# Patient Record
Sex: FEMALE | Race: WHITE | NOT HISPANIC OR LATINO | URBAN - METROPOLITAN AREA
[De-identification: names, ages, dates, MRNs, and addresses within clinical notes are randomized per-mention and may not be internally consistent; named-entity substitution may affect disease eponyms.]

---

## 2017-05-08 ENCOUNTER — APPOINTMENT (RX ONLY)
Dept: URBAN - METROPOLITAN AREA CLINIC 91 | Facility: CLINIC | Age: 82
Setting detail: DERMATOLOGY
End: 2017-05-08

## 2017-05-08 DIAGNOSIS — L85.3 XEROSIS CUTIS: ICD-10-CM

## 2017-05-08 DIAGNOSIS — D22 MELANOCYTIC NEVI: ICD-10-CM

## 2017-05-08 DIAGNOSIS — L57.0 ACTINIC KERATOSIS: ICD-10-CM

## 2017-05-08 DIAGNOSIS — Z71.89 OTHER SPECIFIED COUNSELING: ICD-10-CM

## 2017-05-08 DIAGNOSIS — L82.1 OTHER SEBORRHEIC KERATOSIS: ICD-10-CM

## 2017-05-08 PROBLEM — Z85.828 PERSONAL HISTORY OF OTHER MALIGNANT NEOPLASM OF SKIN: Status: ACTIVE | Noted: 2017-05-08

## 2017-05-08 PROBLEM — E78.5 HYPERLIPIDEMIA, UNSPECIFIED: Status: ACTIVE | Noted: 2017-05-08

## 2017-05-08 PROBLEM — D48.5 NEOPLASM OF UNCERTAIN BEHAVIOR OF SKIN: Status: ACTIVE | Noted: 2017-05-08

## 2017-05-08 PROBLEM — Z85.3 PERSONAL HISTORY OF MALIGNANT NEOPLASM OF BREAST: Status: ACTIVE | Noted: 2017-05-08

## 2017-05-08 PROCEDURE — ? COUNSELING

## 2017-05-08 PROCEDURE — ? LIQUID NITROGEN

## 2017-05-08 PROCEDURE — 17000 DESTRUCT PREMALG LESION: CPT

## 2017-05-08 PROCEDURE — 11100: CPT | Mod: 59

## 2017-05-08 PROCEDURE — 17003 DESTRUCT PREMALG LES 2-14: CPT

## 2017-05-08 PROCEDURE — ? TREATMENT REGIMEN

## 2017-05-08 PROCEDURE — 99214 OFFICE O/P EST MOD 30 MIN: CPT | Mod: 25

## 2017-05-08 PROCEDURE — ? BIOPSY BY SHAVE METHOD

## 2017-05-08 ASSESSMENT — LOCATION DETAILED DESCRIPTION DERM
LOCATION DETAILED: LEFT DISTAL POSTERIOR THIGH
LOCATION DETAILED: LEFT ANTERIOR SHOULDER
LOCATION DETAILED: RIGHT RIB CAGE
LOCATION DETAILED: RIGHT CLAVICULAR NECK
LOCATION DETAILED: RIGHT ANTERIOR DISTAL THIGH
LOCATION DETAILED: RIGHT SUPERIOR LATERAL UPPER BACK

## 2017-05-08 ASSESSMENT — LOCATION ZONE DERM
LOCATION ZONE: NECK
LOCATION ZONE: ARM
LOCATION ZONE: LEG
LOCATION ZONE: TRUNK

## 2017-05-08 ASSESSMENT — LOCATION SIMPLE DESCRIPTION DERM
LOCATION SIMPLE: RIGHT BACK
LOCATION SIMPLE: RIGHT THIGH
LOCATION SIMPLE: RIGHT ANTERIOR NECK
LOCATION SIMPLE: LEFT POSTERIOR THIGH
LOCATION SIMPLE: ABDOMEN
LOCATION SIMPLE: LEFT SHOULDER

## 2017-05-08 NOTE — PROCEDURE: BIOPSY BY SHAVE METHOD
Biopsy Method: Dermablade
Billing Type: [employer]
Electrodesiccation Text: The wound bed was treated with electrodesiccation after the biopsy was performed.
Hemostasis: Aluminum Chloride
Type Of Destruction Used: Curettage
Silver Nitrate Text: The wound bed was treated with silver nitrate after the biopsy was performed.
Detail Level: Detailed
Curettage Text: The wound bed was treated with curettage after the biopsy was performed.
Additional Anesthesia Volume In Cc (Will Not Render If 0): 0
Post-care instructions reviewed with the patient in detail. The patient is to keep the biopsy site dry overnight. Remove the bandage and shower as normal with soap and water, dry the area, apply vaseline and a band-aid. Repeat this process daily for five days.
Anesthesia Type: 1% lidocaine with epinephrine
Wound Care: Vaseline
Bill For Surgical Tray: no
Electrodesiccation And Curettage Text: The wound bed was treated with electrodesiccation and curettage after the biopsy was performed.
Render Post-Care Instructions In Note?: yes
Biopsy Type: H and E
Consent was obtained and risks were reviewed including but not limited to scarring, infection, bleeding, scabbing, incomplete removal, nerve damage and allergy to anesthesia.
Anesthesia Volume In Cc (Will Not Render If 0): 0.5
Notification Instructions: Patient will be notified of biopsy results. However, patient instructed to call the office if not contacted within 2 weeks.
Cryotherapy Text: The wound bed was treated with cryotherapy after the biopsy was performed.
Size Of Lesion In Cm: 0.2
Dressing: bandage

## 2017-05-08 NOTE — PROCEDURE: LIQUID NITROGEN
Consent: The patient's consent was obtained including but not limited to risks of crusting, scabbing, blistering, scarring, darker or lighter pigmentary change, recurrence, incomplete removal and infection.
Number Of Freeze-Thaw Cycles: 2 freeze-thaw cycles
Detail Level: Detailed
Post-Care Instructions: I reviewed with the patient in detail post-care instructions. Patient is to wear sunprotection, and avoid picking at any of the treated lesions. Pt may apply Vaseline to crusted or scabbing areas.
Duration Of Freeze Thaw-Cycle (Seconds): 6
Render Post-Care Instructions In Note?: yes

## 2017-11-13 ENCOUNTER — APPOINTMENT (RX ONLY)
Dept: URBAN - METROPOLITAN AREA CLINIC 91 | Facility: CLINIC | Age: 82
Setting detail: DERMATOLOGY
End: 2017-11-13

## 2017-11-13 DIAGNOSIS — L81.4 OTHER MELANIN HYPERPIGMENTATION: ICD-10-CM

## 2017-11-13 DIAGNOSIS — Z71.89 OTHER SPECIFIED COUNSELING: ICD-10-CM

## 2017-11-13 DIAGNOSIS — L82.0 INFLAMED SEBORRHEIC KERATOSIS: ICD-10-CM

## 2017-11-13 DIAGNOSIS — L57.0 ACTINIC KERATOSIS: ICD-10-CM

## 2017-11-13 PROBLEM — E03.9 HYPOTHYROIDISM, UNSPECIFIED: Status: ACTIVE | Noted: 2017-11-13

## 2017-11-13 PROCEDURE — ? LIQUID NITROGEN

## 2017-11-13 PROCEDURE — 17000 DESTRUCT PREMALG LESION: CPT

## 2017-11-13 PROCEDURE — ? COUNSELING

## 2017-11-13 PROCEDURE — 99214 OFFICE O/P EST MOD 30 MIN: CPT | Mod: 25

## 2017-11-13 ASSESSMENT — LOCATION SIMPLE DESCRIPTION DERM
LOCATION SIMPLE: LEFT THIGH
LOCATION SIMPLE: RIGHT UPPER BACK
LOCATION SIMPLE: CHEST

## 2017-11-13 ASSESSMENT — LOCATION ZONE DERM
LOCATION ZONE: LEG
LOCATION ZONE: TRUNK

## 2017-11-13 ASSESSMENT — LOCATION DETAILED DESCRIPTION DERM
LOCATION DETAILED: LEFT ANTERIOR DISTAL THIGH
LOCATION DETAILED: RIGHT SUPERIOR MEDIAL UPPER BACK
LOCATION DETAILED: LOWER STERNUM

## 2017-11-13 NOTE — PROCEDURE: LIQUID NITROGEN
Number Of Freeze-Thaw Cycles: 2 freeze-thaw cycles
Detail Level: Detailed
Render Post-Care Instructions In Note?: yes
Duration Of Freeze Thaw-Cycle (Seconds): 6
Consent: The patient's consent was obtained including but not limited to risks of crusting, scabbing, blistering, scarring, darker or lighter pigmentary change, recurrence, incomplete removal and infection.
Post-Care Instructions: I reviewed with the patient in detail post-care instructions. Patient is to wear sunprotection, and avoid picking at any of the treated lesions. Pt may apply Vaseline to crusted or scabbing areas.

## 2018-05-14 ENCOUNTER — APPOINTMENT (RX ONLY)
Dept: URBAN - METROPOLITAN AREA CLINIC 91 | Facility: CLINIC | Age: 83
Setting detail: DERMATOLOGY
End: 2018-05-14

## 2018-05-14 DIAGNOSIS — D18.0 HEMANGIOMA: ICD-10-CM

## 2018-05-14 DIAGNOSIS — D69.2 OTHER NONTHROMBOCYTOPENIC PURPURA: ICD-10-CM

## 2018-05-14 DIAGNOSIS — D22 MELANOCYTIC NEVI: ICD-10-CM

## 2018-05-14 DIAGNOSIS — Z71.89 OTHER SPECIFIED COUNSELING: ICD-10-CM

## 2018-05-14 DIAGNOSIS — L84 CORNS AND CALLOSITIES: ICD-10-CM

## 2018-05-14 DIAGNOSIS — L57.0 ACTINIC KERATOSIS: ICD-10-CM

## 2018-05-14 PROBLEM — D48.5 NEOPLASM OF UNCERTAIN BEHAVIOR OF SKIN: Status: ACTIVE | Noted: 2018-05-14

## 2018-05-14 PROBLEM — D18.01 HEMANGIOMA OF SKIN AND SUBCUTANEOUS TISSUE: Status: ACTIVE | Noted: 2018-05-14

## 2018-05-14 PROBLEM — Z85.3 PERSONAL HISTORY OF MALIGNANT NEOPLASM OF BREAST: Status: ACTIVE | Noted: 2018-05-14

## 2018-05-14 PROCEDURE — ? BIOPSY BY SHAVE METHOD

## 2018-05-14 PROCEDURE — 99214 OFFICE O/P EST MOD 30 MIN: CPT | Mod: 25

## 2018-05-14 PROCEDURE — 17003 DESTRUCT PREMALG LES 2-14: CPT

## 2018-05-14 PROCEDURE — ? RECOMMENDATIONS

## 2018-05-14 PROCEDURE — ? LIQUID NITROGEN

## 2018-05-14 PROCEDURE — ? COUNSELING

## 2018-05-14 PROCEDURE — 11100: CPT | Mod: 59

## 2018-05-14 PROCEDURE — 17000 DESTRUCT PREMALG LESION: CPT

## 2018-05-14 PROCEDURE — ? NOTED ON EXAM BUT NOT TREATED

## 2018-05-14 ASSESSMENT — LOCATION ZONE DERM
LOCATION ZONE: ARM
LOCATION ZONE: FEET
LOCATION ZONE: TRUNK
LOCATION ZONE: LEG

## 2018-05-14 ASSESSMENT — LOCATION DETAILED DESCRIPTION DERM
LOCATION DETAILED: LEFT PROXIMAL CALF
LOCATION DETAILED: LEFT INFERIOR LATERAL UPPER BACK
LOCATION DETAILED: STERNAL NOTCH
LOCATION DETAILED: LEFT PLANTAR FOREFOOT OVERLYING 1ST METATARSAL
LOCATION DETAILED: LEFT POPLITEAL SKIN
LOCATION DETAILED: RIGHT DISTAL DORSAL FOREARM

## 2018-05-14 ASSESSMENT — LOCATION SIMPLE DESCRIPTION DERM
LOCATION SIMPLE: CHEST
LOCATION SIMPLE: LEFT CALF
LOCATION SIMPLE: LEFT POPLITEAL SKIN
LOCATION SIMPLE: LEFT PLANTAR SURFACE
LOCATION SIMPLE: LEFT UPPER BACK
LOCATION SIMPLE: RIGHT FOREARM

## 2018-05-14 NOTE — PROCEDURE: BIOPSY BY SHAVE METHOD
Dressing: bandage
Curettage Text: The wound bed was treated with curettage after the biopsy was performed.
Additional Anesthesia Volume In Cc (Will Not Render If 0): 0
Bill For Surgical Tray: no
Consent was obtained and risks were reviewed including but not limited to scarring, infection, bleeding, scabbing, incomplete removal, nerve damage and allergy to anesthesia.
Hemostasis: Aluminum Chloride
Size Of Lesion In Cm: 0.2
Cryotherapy Text: The wound bed was treated with cryotherapy after the biopsy was performed.
Silver Nitrate Text: The wound bed was treated with silver nitrate after the biopsy was performed.
Was A Bandage Applied: Yes
Type Of Destruction Used: Curettage
Anesthesia Volume In Cc (Will Not Render If 0): 0.5
Biopsy Type: H and E
Post-care instructions reviewed with the patient in detail. The patient is to keep the biopsy site dry overnight. Remove the bandage and shower as normal with soap and water, dry the area, apply vaseline and a band-aid. Repeat this process daily for five days.
Notification Instructions: Patient will be notified of biopsy results. However, patient instructed to call the office if not contacted within 2 weeks.
Biopsy Method: Dermablade
Anesthesia Type: 1% lidocaine with epinephrine
Detail Level: Detailed
Wound Care: Vaseline
Electrodesiccation And Curettage Text: The wound bed was treated with electrodesiccation and curettage after the biopsy was performed.
Electrodesiccation Text: The wound bed was treated with electrodesiccation after the biopsy was performed.
Billing Type: [employer]

## 2018-05-14 NOTE — PROCEDURE: RECOMMENDATIONS
Recommendation Preamble: The following recommendations were made during the visit:Nature, etiology, and reassurance.
Recommendations (Free Text): Follow up with her podiatrist
Detail Level: Zone

## 2018-05-14 NOTE — PROCEDURE: LIQUID NITROGEN
Post-Care Instructions: I reviewed with the patient in detail post-care instructions. Patient is to wear sunprotection, and avoid picking at any of the treated lesions. Pt may apply Vaseline to crusted or scabbing areas.
Duration Of Freeze Thaw-Cycle (Seconds): 6
Render Post-Care Instructions In Note?: yes
Number Of Freeze-Thaw Cycles: 2 freeze-thaw cycles
Detail Level: Detailed
Consent: The patient's consent was obtained including but not limited to risks of crusting, scabbing, blistering, scarring, darker or lighter pigmentary change, recurrence, incomplete removal and infection.

## 2018-05-14 NOTE — PROCEDURE: MIPS QUALITY
Quality 131: Pain Assessment And Follow-Up: Pain assessment using a standardized tool is documented as negative, no follow-up plan required
Quality 111:Pneumonia Vaccination Status For Older Adults: Pneumococcal Vaccination Previously Received
Quality 110: Preventive Care And Screening: Influenza Immunization: Influenza Immunization previously received during influenza season
Detail Level: Detailed
Quality 130: Documentation Of Current Medications In The Medical Record: Current Medications Documented

## 2018-12-11 ENCOUNTER — APPOINTMENT (RX ONLY)
Dept: URBAN - METROPOLITAN AREA CLINIC 91 | Facility: CLINIC | Age: 83
Setting detail: DERMATOLOGY
End: 2018-12-11

## 2018-12-11 DIAGNOSIS — L57.0 ACTINIC KERATOSIS: ICD-10-CM

## 2018-12-11 DIAGNOSIS — D22 MELANOCYTIC NEVI: ICD-10-CM

## 2018-12-11 DIAGNOSIS — L85.3 XEROSIS CUTIS: ICD-10-CM

## 2018-12-11 DIAGNOSIS — Z85.828 PERSONAL HISTORY OF OTHER MALIGNANT NEOPLASM OF SKIN: ICD-10-CM

## 2018-12-11 DIAGNOSIS — R23.3 SPONTANEOUS ECCHYMOSES: ICD-10-CM

## 2018-12-11 DIAGNOSIS — Z71.89 OTHER SPECIFIED COUNSELING: ICD-10-CM

## 2018-12-11 PROBLEM — D22.5 MELANOCYTIC NEVI OF TRUNK: Status: ACTIVE | Noted: 2018-12-11

## 2018-12-11 PROBLEM — J30.1 ALLERGIC RHINITIS DUE TO POLLEN: Status: ACTIVE | Noted: 2018-12-11

## 2018-12-11 PROCEDURE — ? LIQUID NITROGEN

## 2018-12-11 PROCEDURE — ? COUNSELING

## 2018-12-11 PROCEDURE — 17003 DESTRUCT PREMALG LES 2-14: CPT

## 2018-12-11 PROCEDURE — 99214 OFFICE O/P EST MOD 30 MIN: CPT | Mod: 25

## 2018-12-11 PROCEDURE — 17000 DESTRUCT PREMALG LESION: CPT

## 2018-12-11 ASSESSMENT — LOCATION SIMPLE DESCRIPTION DERM
LOCATION SIMPLE: LEFT FOREARM
LOCATION SIMPLE: LEFT CALF
LOCATION SIMPLE: RIGHT UPPER ARM
LOCATION SIMPLE: RIGHT FOREARM
LOCATION SIMPLE: RIGHT UPPER BACK
LOCATION SIMPLE: RIGHT ANTERIOR NECK
LOCATION SIMPLE: RIGHT CLAVICULAR SKIN
LOCATION SIMPLE: POSTERIOR NECK

## 2018-12-11 ASSESSMENT — LOCATION DETAILED DESCRIPTION DERM
LOCATION DETAILED: RIGHT SUPERIOR UPPER BACK
LOCATION DETAILED: RIGHT ANTERIOR PROXIMAL UPPER ARM
LOCATION DETAILED: LEFT LATERAL TRAPEZIAL NECK
LOCATION DETAILED: RIGHT DISTAL DORSAL FOREARM
LOCATION DETAILED: LEFT PROXIMAL DORSAL FOREARM
LOCATION DETAILED: LEFT DISTAL CALF
LOCATION DETAILED: RIGHT CLAVICULAR SKIN
LOCATION DETAILED: RIGHT CLAVICULAR NECK

## 2018-12-11 ASSESSMENT — LOCATION ZONE DERM
LOCATION ZONE: NECK
LOCATION ZONE: ARM
LOCATION ZONE: TRUNK
LOCATION ZONE: LEG

## 2018-12-11 NOTE — PROCEDURE: LIQUID NITROGEN
Detail Level: Detailed
Post-Care Instructions: I reviewed with the patient in detail post-care instructions. Patient is to wear sunprotection, and avoid picking at any of the treated lesions. Pt may apply Vaseline to crusted or scabbing areas.
Number Of Freeze-Thaw Cycles: 2 freeze-thaw cycles
Consent: The patient's consent was obtained including but not limited to risks of crusting, scabbing, blistering, scarring, darker or lighter pigmentary change, recurrence, incomplete removal and infection.
Duration Of Freeze Thaw-Cycle (Seconds): 6
Render Post-Care Instructions In Note?: yes

## 2019-06-10 ENCOUNTER — APPOINTMENT (RX ONLY)
Dept: URBAN - METROPOLITAN AREA CLINIC 91 | Facility: CLINIC | Age: 84
Setting detail: DERMATOLOGY
End: 2019-06-10

## 2019-06-10 DIAGNOSIS — L82.1 OTHER SEBORRHEIC KERATOSIS: ICD-10-CM

## 2019-06-10 DIAGNOSIS — L82.0 INFLAMED SEBORRHEIC KERATOSIS: ICD-10-CM

## 2019-06-10 DIAGNOSIS — Z71.89 OTHER SPECIFIED COUNSELING: ICD-10-CM

## 2019-06-10 DIAGNOSIS — D18.0 HEMANGIOMA: ICD-10-CM

## 2019-06-10 DIAGNOSIS — Z85.828 PERSONAL HISTORY OF OTHER MALIGNANT NEOPLASM OF SKIN: ICD-10-CM

## 2019-06-10 PROBLEM — D18.01 HEMANGIOMA OF SKIN AND SUBCUTANEOUS TISSUE: Status: ACTIVE | Noted: 2019-06-10

## 2019-06-10 PROBLEM — J30.1 ALLERGIC RHINITIS DUE TO POLLEN: Status: ACTIVE | Noted: 2019-06-10

## 2019-06-10 PROCEDURE — ? OTHER

## 2019-06-10 PROCEDURE — ? COUNSELING

## 2019-06-10 PROCEDURE — 99214 OFFICE O/P EST MOD 30 MIN: CPT

## 2019-06-10 ASSESSMENT — LOCATION SIMPLE DESCRIPTION DERM
LOCATION SIMPLE: RIGHT FOREARM
LOCATION SIMPLE: RIGHT ANTERIOR NECK
LOCATION SIMPLE: LEFT BUTTOCK
LOCATION SIMPLE: LEFT ANTERIOR NECK
LOCATION SIMPLE: RIGHT UPPER BACK
LOCATION SIMPLE: CHEST
LOCATION SIMPLE: LEFT THIGH

## 2019-06-10 ASSESSMENT — LOCATION DETAILED DESCRIPTION DERM
LOCATION DETAILED: RIGHT DISTAL DORSAL FOREARM
LOCATION DETAILED: LEFT BUTTOCK
LOCATION DETAILED: LEFT ANTERIOR DISTAL THIGH
LOCATION DETAILED: RIGHT INFERIOR LATERAL NECK
LOCATION DETAILED: LEFT INFERIOR LATERAL NECK
LOCATION DETAILED: LEFT MEDIAL SUPERIOR CHEST
LOCATION DETAILED: RIGHT MID-UPPER BACK

## 2019-06-10 ASSESSMENT — LOCATION ZONE DERM
LOCATION ZONE: TRUNK
LOCATION ZONE: LEG
LOCATION ZONE: ARM
LOCATION ZONE: NECK

## 2019-12-16 ENCOUNTER — APPOINTMENT (RX ONLY)
Dept: URBAN - METROPOLITAN AREA CLINIC 91 | Facility: CLINIC | Age: 84
Setting detail: DERMATOLOGY
End: 2019-12-16

## 2019-12-16 DIAGNOSIS — L57.0 ACTINIC KERATOSIS: ICD-10-CM

## 2019-12-16 DIAGNOSIS — Z71.89 OTHER SPECIFIED COUNSELING: ICD-10-CM

## 2019-12-16 DIAGNOSIS — Z85.828 PERSONAL HISTORY OF OTHER MALIGNANT NEOPLASM OF SKIN: ICD-10-CM

## 2019-12-16 DIAGNOSIS — L82.1 OTHER SEBORRHEIC KERATOSIS: ICD-10-CM

## 2019-12-16 PROCEDURE — ? COUNSELING

## 2019-12-16 PROCEDURE — 17000 DESTRUCT PREMALG LESION: CPT

## 2019-12-16 PROCEDURE — ? LIQUID NITROGEN

## 2019-12-16 PROCEDURE — 99214 OFFICE O/P EST MOD 30 MIN: CPT | Mod: 25

## 2019-12-16 PROCEDURE — 17003 DESTRUCT PREMALG LES 2-14: CPT

## 2019-12-16 ASSESSMENT — LOCATION SIMPLE DESCRIPTION DERM
LOCATION SIMPLE: LEFT HAND
LOCATION SIMPLE: LEFT UPPER BACK
LOCATION SIMPLE: CHEST
LOCATION SIMPLE: RIGHT HAND
LOCATION SIMPLE: LEFT FOREARM
LOCATION SIMPLE: LEFT THIGH

## 2019-12-16 ASSESSMENT — LOCATION DETAILED DESCRIPTION DERM
LOCATION DETAILED: LEFT ULNAR DORSAL HAND
LOCATION DETAILED: LEFT ANTERIOR DISTAL THIGH
LOCATION DETAILED: RIGHT RADIAL DORSAL HAND
LOCATION DETAILED: LEFT SUPERIOR UPPER BACK
LOCATION DETAILED: RIGHT MEDIAL SUPERIOR CHEST
LOCATION DETAILED: LEFT DISTAL DORSAL FOREARM

## 2019-12-16 ASSESSMENT — LOCATION ZONE DERM
LOCATION ZONE: LEG
LOCATION ZONE: HAND
LOCATION ZONE: ARM
LOCATION ZONE: TRUNK

## 2019-12-16 NOTE — PROCEDURE: LIQUID NITROGEN
Consent: The patient's consent was obtained including but not limited to risks of crusting, scabbing, blistering, scarring, darker or lighter pigmentary change, recurrence, incomplete removal and infection.
Duration Of Freeze Thaw-Cycle (Seconds): 6
Render Post-Care Instructions In Note?: yes
Render Note In Bullet Format When Appropriate: No
Post-Care Instructions: I reviewed with the patient in detail post-care instructions. Patient is to wear sunprotection, and avoid picking at any of the treated lesions. Pt may apply Vaseline to crusted or scabbing areas.
Number Of Freeze-Thaw Cycles: 2 freeze-thaw cycles
Detail Level: Detailed

## 2020-08-31 ENCOUNTER — APPOINTMENT (RX ONLY)
Dept: URBAN - METROPOLITAN AREA CLINIC 91 | Facility: CLINIC | Age: 85
Setting detail: DERMATOLOGY
End: 2020-08-31

## 2020-08-31 DIAGNOSIS — Z71.89 OTHER SPECIFIED COUNSELING: ICD-10-CM

## 2020-08-31 DIAGNOSIS — D18.0 HEMANGIOMA: ICD-10-CM

## 2020-08-31 DIAGNOSIS — L81.4 OTHER MELANIN HYPERPIGMENTATION: ICD-10-CM

## 2020-08-31 DIAGNOSIS — T07XXXA ABRASION OR FRICTION BURN OF OTHER, MULTIPLE, AND UNSPECIFIED SITES, WITHOUT MENTION OF INFECTION: ICD-10-CM

## 2020-08-31 DIAGNOSIS — D69.2 OTHER NONTHROMBOCYTOPENIC PURPURA: ICD-10-CM

## 2020-08-31 DIAGNOSIS — L82.1 OTHER SEBORRHEIC KERATOSIS: ICD-10-CM

## 2020-08-31 PROBLEM — K75.9 INFLAMMATORY LIVER DISEASE, UNSPECIFIED: Status: ACTIVE | Noted: 2020-08-31

## 2020-08-31 PROBLEM — D18.01 HEMANGIOMA OF SKIN AND SUBCUTANEOUS TISSUE: Status: ACTIVE | Noted: 2020-08-31

## 2020-08-31 PROBLEM — S80.921A UNSPECIFIED SUPERFICIAL INJURY OF RIGHT LOWER LEG, INITIAL ENCOUNTER: Status: ACTIVE | Noted: 2020-08-31

## 2020-08-31 PROCEDURE — 99214 OFFICE O/P EST MOD 30 MIN: CPT

## 2020-08-31 PROCEDURE — ? COUNSELING

## 2020-08-31 PROCEDURE — ? NOTED ON EXAM BUT NOT TREATED

## 2020-08-31 ASSESSMENT — LOCATION DETAILED DESCRIPTION DERM
LOCATION DETAILED: LEFT ANTERIOR SHOULDER
LOCATION DETAILED: LEFT ANTERIOR PROXIMAL THIGH
LOCATION DETAILED: LEFT DISTAL DORSAL FOREARM
LOCATION DETAILED: RIGHT MEDIAL UPPER BACK
LOCATION DETAILED: RIGHT DISTAL DORSAL FOREARM
LOCATION DETAILED: RIGHT ANTERIOR DISTAL THIGH
LOCATION DETAILED: LOWER STERNUM
LOCATION DETAILED: RIGHT PROXIMAL PRETIBIAL REGION
LOCATION DETAILED: RIGHT ANTERIOR SHOULDER

## 2020-08-31 ASSESSMENT — LOCATION ZONE DERM
LOCATION ZONE: ARM
LOCATION ZONE: TRUNK
LOCATION ZONE: LEG

## 2020-08-31 ASSESSMENT — LOCATION SIMPLE DESCRIPTION DERM
LOCATION SIMPLE: LEFT SHOULDER
LOCATION SIMPLE: LEFT FOREARM
LOCATION SIMPLE: RIGHT THIGH
LOCATION SIMPLE: RIGHT UPPER BACK
LOCATION SIMPLE: RIGHT FOREARM
LOCATION SIMPLE: CHEST
LOCATION SIMPLE: RIGHT PRETIBIAL REGION
LOCATION SIMPLE: RIGHT SHOULDER
LOCATION SIMPLE: LEFT THIGH

## 2021-03-01 ENCOUNTER — APPOINTMENT (RX ONLY)
Dept: URBAN - METROPOLITAN AREA CLINIC 91 | Facility: CLINIC | Age: 86
Setting detail: DERMATOLOGY
End: 2021-03-01

## 2021-03-01 DIAGNOSIS — L84 CORNS AND CALLOSITIES: ICD-10-CM

## 2021-03-01 DIAGNOSIS — L57.8 OTHER SKIN CHANGES DUE TO CHRONIC EXPOSURE TO NONIONIZING RADIATION: ICD-10-CM

## 2021-03-01 DIAGNOSIS — D69.2 OTHER NONTHROMBOCYTOPENIC PURPURA: ICD-10-CM

## 2021-03-01 DIAGNOSIS — Z85.828 PERSONAL HISTORY OF OTHER MALIGNANT NEOPLASM OF SKIN: ICD-10-CM

## 2021-03-01 DIAGNOSIS — Z87.2 PERSONAL HISTORY OF DISEASES OF THE SKIN AND SUBCUTANEOUS TISSUE: ICD-10-CM

## 2021-03-01 DIAGNOSIS — L85.3 XEROSIS CUTIS: ICD-10-CM | Status: STABLE

## 2021-03-01 DIAGNOSIS — Z71.89 OTHER SPECIFIED COUNSELING: ICD-10-CM

## 2021-03-01 PROBLEM — L57.0 ACTINIC KERATOSIS: Status: ACTIVE | Noted: 2021-03-01

## 2021-03-01 PROBLEM — D48.5 NEOPLASM OF UNCERTAIN BEHAVIOR OF SKIN: Status: ACTIVE | Noted: 2021-03-01

## 2021-03-01 PROBLEM — L20.84 INTRINSIC (ALLERGIC) ECZEMA: Status: ACTIVE | Noted: 2021-03-01

## 2021-03-01 PROCEDURE — ? COUNSELING

## 2021-03-01 PROCEDURE — ? OBSERVATION

## 2021-03-01 PROCEDURE — ? OTHER

## 2021-03-01 PROCEDURE — ? BIOPSY BY SHAVE METHOD

## 2021-03-01 PROCEDURE — 99214 OFFICE O/P EST MOD 30 MIN: CPT | Mod: 25

## 2021-03-01 PROCEDURE — 11102 TANGNTL BX SKIN SINGLE LES: CPT

## 2021-03-01 PROCEDURE — ? PRESCRIPTION MEDICATION MANAGEMENT

## 2021-03-01 ASSESSMENT — LOCATION SIMPLE DESCRIPTION DERM
LOCATION SIMPLE: LEFT CHEEK
LOCATION SIMPLE: LEFT FOREARM
LOCATION SIMPLE: RIGHT PRETIBIAL REGION
LOCATION SIMPLE: RIGHT CHEEK
LOCATION SIMPLE: RIGHT THIGH
LOCATION SIMPLE: LEFT HEEL
LOCATION SIMPLE: RIGHT FOREARM
LOCATION SIMPLE: LEFT PRETIBIAL REGION

## 2021-03-01 ASSESSMENT — LOCATION DETAILED DESCRIPTION DERM
LOCATION DETAILED: RIGHT ANTERIOR DISTAL THIGH
LOCATION DETAILED: RIGHT PROXIMAL PRETIBIAL REGION
LOCATION DETAILED: LEFT INFERIOR CENTRAL MALAR CHEEK
LOCATION DETAILED: RIGHT INFERIOR CENTRAL MALAR CHEEK
LOCATION DETAILED: LEFT DISTAL DORSAL FOREARM
LOCATION DETAILED: LEFT PROXIMAL PRETIBIAL REGION
LOCATION DETAILED: LEFT HEEL
LOCATION DETAILED: RIGHT PROXIMAL RADIAL DORSAL FOREARM

## 2021-03-01 ASSESSMENT — LOCATION ZONE DERM
LOCATION ZONE: ARM
LOCATION ZONE: LEG
LOCATION ZONE: FEET
LOCATION ZONE: FACE

## 2021-03-01 NOTE — PROCEDURE: PRESCRIPTION MEDICATION MANAGEMENT
Render In Strict Bullet Format?: No
Plan: Moisturize everyday after the shower with Lachydrin cream
Detail Level: Zone

## 2021-03-01 NOTE — PROCEDURE: OTHER
Other (Free Text): Be certain that shoes fit properly
Render Risk Assessment In Note?: no
Note Text (......Xxx Chief Complaint.): This diagnosis correlates with the
Detail Level: Detailed

## 2021-03-01 NOTE — PROCEDURE: BIOPSY BY SHAVE METHOD
X Size Of Lesion In Cm: 0
Bill For Surgical Tray: no
Type Of Destruction Used: Curettage
Electrodesiccation And Curettage Text: The wound bed was treated with electrodesiccation and curettage after the biopsy was performed.
Wound Care: Vaseline
Render Post-Care Instructions In Note?: yes
Notification Instructions: Patient will be notified of biopsy results. However, patient instructed to call the office if not contacted within 2 weeks.
Depth Of Biopsy: dermis
Cryotherapy Text: The wound bed was treated with cryotherapy after the biopsy was performed.
Anesthesia Volume In Cc (Will Not Render If 0): 0.5
Silver Nitrate Text: The wound bed was treated with silver nitrate after the biopsy was performed.
Hemostasis: Aluminum Chloride
Size Of Lesion In Cm: 1
Detail Level: Detailed
Information: Selecting Yes will display possible errors in your note based on the variables you have selected. This validation is only offered as a suggestion for you. PLEASE NOTE THAT THE VALIDATION TEXT WILL BE REMOVED WHEN YOU FINALIZE YOUR NOTE. IF YOU WANT TO FAX A PRELIMINARY NOTE YOU WILL NEED TO TOGGLE THIS TO 'NO' IF YOU DO NOT WANT IT IN YOUR FAXED NOTE.
Billing Type: [employer]
Anesthesia Type: 1% lidocaine with epinephrine
Biopsy Method: Dermablade
Electrodesiccation Text: The wound bed was treated with electrodesiccation after the biopsy was performed.
Consent was obtained and risks were reviewed including but not limited to scarring, infection, bleeding, scabbing, incomplete removal, nerve damage and allergy to anesthesia.
Curettage Text: The wound bed was treated with curettage after the biopsy was performed.
Dressing: bandage
Post-care instructions reviewed with the patient in detail. The patient is to keep the biopsy site dry overnight. Remove the bandage and shower as normal with soap and water, dry the area, apply vaseline and a band-aid. Repeat this process daily for five days.
Biopsy Type: H and E

## 2021-03-24 ENCOUNTER — APPOINTMENT (RX ONLY)
Dept: URBAN - METROPOLITAN AREA CLINIC 91 | Facility: CLINIC | Age: 86
Setting detail: DERMATOLOGY
End: 2021-03-24

## 2021-03-24 PROBLEM — C44.519 BASAL CELL CARCINOMA OF SKIN OF OTHER PART OF TRUNK: Status: ACTIVE | Noted: 2021-03-24

## 2021-03-24 PROCEDURE — ? CURETTAGE AND DESTRUCTION

## 2021-03-24 PROCEDURE — 17262 DSTRJ MAL LES T/A/L 1.1-2.0: CPT

## 2021-03-24 PROCEDURE — ? COUNSELING

## 2021-03-24 PROCEDURE — ? PATHOLOGY DISCUSSION

## 2021-03-24 NOTE — PROCEDURE: CURETTAGE AND DESTRUCTION
Anesthesia Type: 1% lidocaine without epinephrine
Total Volume (Ccs): 1
Post-care instructions reviewed with the patient in detail. The patient is to keep the treated site dry overnight. Remove the bandage and shower as normal with soap and water, dry the area, apply vaseline and a band-aid. Repeat this process daily for 10 days. Should the patient develop any fevers, chills, bleeding, severe pain patient will contact the office immediately.
Additional Information: (Optional): The wound was cleaned, and a pressure dressing was applied. The patient received detailed post-op instructions.
Cautery Type: electrodesiccation
Detail Level: Detailed
Hide Accession Number?: No
Render Post-Care Instructions In Note?: yes
Final Size Statement: The size of the lesion after curettage was
Size Of Lesion In Cm: 0.9
Anesthesia Volume In Cc: 2
What Was Performed First?: Curettage
Size Of Lesion After Curettage: 1.9
Consent was obtained from the patient. The risks and benefits to therapy were discussed in detail. Specifically, the risks of infection, scarring, bleeding, prolonged wound healing, incomplete removal, allergy to anesthesia, nerve injury and recurrence were addressed. Prior to the procedure, the treatment site was clearly identified and confirmed by the patient. All components of Universal Protocol/PAUSE Rule completed.
Number Of Curettages: 3
Bill As A Line Item Or As Units: Line Item

## 2021-09-01 ENCOUNTER — APPOINTMENT (RX ONLY)
Dept: URBAN - METROPOLITAN AREA CLINIC 91 | Facility: CLINIC | Age: 86
Setting detail: DERMATOLOGY
End: 2021-09-01

## 2021-09-01 DIAGNOSIS — Z87.2 PERSONAL HISTORY OF DISEASES OF THE SKIN AND SUBCUTANEOUS TISSUE: ICD-10-CM

## 2021-09-01 DIAGNOSIS — L57.0 ACTINIC KERATOSIS: ICD-10-CM

## 2021-09-01 DIAGNOSIS — L57.8 OTHER SKIN CHANGES DUE TO CHRONIC EXPOSURE TO NONIONIZING RADIATION: ICD-10-CM

## 2021-09-01 DIAGNOSIS — Z71.89 OTHER SPECIFIED COUNSELING: ICD-10-CM

## 2021-09-01 DIAGNOSIS — L85.3 XEROSIS CUTIS: ICD-10-CM | Status: INADEQUATELY CONTROLLED

## 2021-09-01 DIAGNOSIS — Z85.828 PERSONAL HISTORY OF OTHER MALIGNANT NEOPLASM OF SKIN: ICD-10-CM

## 2021-09-01 PROBLEM — L55.1 SUNBURN OF SECOND DEGREE: Status: ACTIVE | Noted: 2021-09-01

## 2021-09-01 PROBLEM — E03.9 HYPOTHYROIDISM, UNSPECIFIED: Status: ACTIVE | Noted: 2021-09-01

## 2021-09-01 PROBLEM — E78.5 HYPERLIPIDEMIA, UNSPECIFIED: Status: ACTIVE | Noted: 2021-09-01

## 2021-09-01 PROBLEM — D48.5 NEOPLASM OF UNCERTAIN BEHAVIOR OF SKIN: Status: ACTIVE | Noted: 2021-09-01

## 2021-09-01 PROCEDURE — ? PRESCRIPTION MEDICATION MANAGEMENT

## 2021-09-01 PROCEDURE — 17003 DESTRUCT PREMALG LES 2-14: CPT

## 2021-09-01 PROCEDURE — ? COUNSELING

## 2021-09-01 PROCEDURE — ? BIOPSY BY SHAVE METHOD

## 2021-09-01 PROCEDURE — 11102 TANGNTL BX SKIN SINGLE LES: CPT

## 2021-09-01 PROCEDURE — ? LIQUID NITROGEN

## 2021-09-01 PROCEDURE — 99214 OFFICE O/P EST MOD 30 MIN: CPT | Mod: 25

## 2021-09-01 PROCEDURE — 17000 DESTRUCT PREMALG LESION: CPT | Mod: 59

## 2021-09-01 ASSESSMENT — LOCATION SIMPLE DESCRIPTION DERM
LOCATION SIMPLE: RIGHT CLAVICULAR SKIN
LOCATION SIMPLE: LEFT UPPER ARM
LOCATION SIMPLE: RIGHT UPPER ARM
LOCATION SIMPLE: RIGHT SHOULDER
LOCATION SIMPLE: RIGHT THIGH
LOCATION SIMPLE: RIGHT UPPER BACK
LOCATION SIMPLE: CHEST
LOCATION SIMPLE: LEFT FOREARM
LOCATION SIMPLE: LEFT THIGH
LOCATION SIMPLE: LEFT CHEEK

## 2021-09-01 ASSESSMENT — LOCATION DETAILED DESCRIPTION DERM
LOCATION DETAILED: LEFT DISTAL DORSAL FOREARM
LOCATION DETAILED: RIGHT CLAVICULAR SKIN
LOCATION DETAILED: LEFT ANTERIOR PROXIMAL THIGH
LOCATION DETAILED: RIGHT ANTERIOR PROXIMAL UPPER ARM
LOCATION DETAILED: RIGHT ANTERIOR PROXIMAL THIGH
LOCATION DETAILED: LEFT ANTERIOR DISTAL UPPER ARM
LOCATION DETAILED: RIGHT LATERAL SHOULDER
LOCATION DETAILED: RIGHT MEDIAL SUPERIOR CHEST
LOCATION DETAILED: RIGHT ANTERIOR DISTAL THIGH
LOCATION DETAILED: RIGHT SUPERIOR UPPER BACK
LOCATION DETAILED: LEFT INFERIOR CENTRAL MALAR CHEEK

## 2021-09-01 ASSESSMENT — LOCATION ZONE DERM
LOCATION ZONE: TRUNK
LOCATION ZONE: LEG
LOCATION ZONE: FACE
LOCATION ZONE: ARM

## 2021-09-01 NOTE — PROCEDURE: BIOPSY BY SHAVE METHOD
Bill For Surgical Tray: no
Notification Instructions: Patient will be notified of biopsy results. However, patient instructed to call the office if not contacted within 2 weeks.
Wound Care: Vaseline
Anesthesia Volume In Cc (Will Not Render If 0): 0.5
Billing Type: [employer]
Silver Nitrate Text: The wound bed was treated with silver nitrate after the biopsy was performed.
Size Of Lesion In Cm: 0.4
Detail Level: Detailed
Additional Anesthesia Volume In Cc (Will Not Render If 0): 0
Electrodesiccation Text: The wound bed was treated with electrodesiccation after the biopsy was performed.
Hemostasis: Aluminum Chloride
Consent was obtained and risks were reviewed including but not limited to scarring, infection, bleeding, scabbing, incomplete removal, nerve damage and allergy to anesthesia.
Curettage Text: The wound bed was treated with curettage after the biopsy was performed.
Information: Selecting Yes will display possible errors in your note based on the variables you have selected. This validation is only offered as a suggestion for you. PLEASE NOTE THAT THE VALIDATION TEXT WILL BE REMOVED WHEN YOU FINALIZE YOUR NOTE. IF YOU WANT TO FAX A PRELIMINARY NOTE YOU WILL NEED TO TOGGLE THIS TO 'NO' IF YOU DO NOT WANT IT IN YOUR FAXED NOTE.
Anesthesia Type: 1% lidocaine with epinephrine
Post-care instructions reviewed with the patient in detail. The patient is to keep the biopsy site dry overnight. Remove the bandage and shower as normal with soap and water, dry the area, apply vaseline and a band-aid. Repeat this process daily for five days.
Was A Bandage Applied: Yes
Biopsy Type: H and E
Dressing: bandage
Biopsy Method: Dermablade
Electrodesiccation And Curettage Text: The wound bed was treated with electrodesiccation and curettage after the biopsy was performed.
Type Of Destruction Used: Curettage
Cryotherapy Text: The wound bed was treated with cryotherapy after the biopsy was performed.
Depth Of Biopsy: dermis

## 2021-09-01 NOTE — PROCEDURE: LIQUID NITROGEN
Render Note In Bullet Format When Appropriate: No
Detail Level: Detailed
Render Post-Care Instructions In Note?: yes
Duration Of Freeze Thaw-Cycle (Seconds): 6
Consent: The patient's consent was obtained including but not limited to risks of crusting, scabbing, blistering, scarring, darker or lighter pigmentary change, recurrence, incomplete removal and infection.
Number Of Freeze-Thaw Cycles: 2 freeze-thaw cycles
Post-Care Instructions: I reviewed with the patient in detail post-care instructions. Patient is to wear sunprotection, and avoid picking at any of the treated lesions. Pt may apply Vaseline to crusted or scabbing areas.

## 2021-09-01 NOTE — PROCEDURE: PRESCRIPTION MEDICATION MANAGEMENT
Detail Level: Zone
Initiate Treatment: Cerave SA cream apply twice daily
Render In Strict Bullet Format?: No

## 2022-03-02 ENCOUNTER — APPOINTMENT (RX ONLY)
Dept: URBAN - METROPOLITAN AREA CLINIC 91 | Facility: CLINIC | Age: 87
Setting detail: DERMATOLOGY
End: 2022-03-02

## 2022-03-02 DIAGNOSIS — Z87.2 PERSONAL HISTORY OF DISEASES OF THE SKIN AND SUBCUTANEOUS TISSUE: ICD-10-CM

## 2022-03-02 DIAGNOSIS — D69.2 OTHER NONTHROMBOCYTOPENIC PURPURA: ICD-10-CM

## 2022-03-02 DIAGNOSIS — D18.0 HEMANGIOMA: ICD-10-CM

## 2022-03-02 DIAGNOSIS — Z71.89 OTHER SPECIFIED COUNSELING: ICD-10-CM

## 2022-03-02 DIAGNOSIS — L85.3 XEROSIS CUTIS: ICD-10-CM

## 2022-03-02 DIAGNOSIS — Z85.828 PERSONAL HISTORY OF OTHER MALIGNANT NEOPLASM OF SKIN: ICD-10-CM

## 2022-03-02 PROBLEM — K75.9 INFLAMMATORY LIVER DISEASE, UNSPECIFIED: Status: ACTIVE | Noted: 2022-03-02

## 2022-03-02 PROBLEM — L57.0 ACTINIC KERATOSIS: Status: ACTIVE | Noted: 2022-03-02

## 2022-03-02 PROBLEM — D48.5 NEOPLASM OF UNCERTAIN BEHAVIOR OF SKIN: Status: ACTIVE | Noted: 2022-03-02

## 2022-03-02 PROBLEM — D18.01 HEMANGIOMA OF SKIN AND SUBCUTANEOUS TISSUE: Status: ACTIVE | Noted: 2022-03-02

## 2022-03-02 PROCEDURE — ? COUNSELING

## 2022-03-02 PROCEDURE — 11103 TANGNTL BX SKIN EA SEP/ADDL: CPT

## 2022-03-02 PROCEDURE — ? PRESCRIPTION MEDICATION MANAGEMENT

## 2022-03-02 PROCEDURE — ? PRESCRIPTION

## 2022-03-02 PROCEDURE — 11102 TANGNTL BX SKIN SINGLE LES: CPT

## 2022-03-02 PROCEDURE — 99214 OFFICE O/P EST MOD 30 MIN: CPT | Mod: 25

## 2022-03-02 PROCEDURE — ? BIOPSY BY SHAVE METHOD

## 2022-03-02 RX ORDER — MUPIROCIN 20 MG/G
OINTMENT TOPICAL
Qty: 1 | Refills: 2 | Status: ERX | COMMUNITY
Start: 2022-03-02

## 2022-03-02 RX ADMIN — MUPIROCIN: 20 OINTMENT TOPICAL at 00:00

## 2022-03-02 ASSESSMENT — LOCATION DETAILED DESCRIPTION DERM
LOCATION DETAILED: RIGHT SUPERIOR UPPER BACK
LOCATION DETAILED: RIGHT DISTAL PRETIBIAL REGION
LOCATION DETAILED: LEFT DISTAL PRETIBIAL REGION
LOCATION DETAILED: RIGHT ANTERIOR DISTAL THIGH
LOCATION DETAILED: LEFT PROXIMAL DORSAL FOREARM
LOCATION DETAILED: RIGHT PROXIMAL PRETIBIAL REGION
LOCATION DETAILED: LEFT DISTAL DORSAL FOREARM
LOCATION DETAILED: RIGHT MEDIAL UPPER BACK
LOCATION DETAILED: LEFT PROXIMAL PRETIBIAL REGION

## 2022-03-02 ASSESSMENT — LOCATION ZONE DERM
LOCATION ZONE: TRUNK
LOCATION ZONE: LEG
LOCATION ZONE: ARM

## 2022-03-02 ASSESSMENT — LOCATION SIMPLE DESCRIPTION DERM
LOCATION SIMPLE: LEFT FOREARM
LOCATION SIMPLE: RIGHT THIGH
LOCATION SIMPLE: RIGHT UPPER BACK
LOCATION SIMPLE: LEFT PRETIBIAL REGION
LOCATION SIMPLE: RIGHT PRETIBIAL REGION

## 2022-03-02 NOTE — PROCEDURE: BIOPSY BY SHAVE METHOD
X Size Of Lesion In Cm: 0
Consent was obtained and risks were reviewed including but not limited to scarring, infection, bleeding, scabbing, incomplete removal, nerve damage and allergy to anesthesia.
Bill [id number] For Specimen Handling/Conveyance To Laboratory?: no
Electrodesiccation And Curettage Text: The wound bed was treated with electrodesiccation and curettage after the biopsy was performed.
Post-care instructions reviewed with the patient in detail. The patient is to keep the biopsy site dry overnight. Remove the bandage and shower as normal with soap and water, dry the area, apply vaseline and a band-aid. Repeat this process daily for five days.
Hemostasis: Aluminum Chloride
Type Of Destruction Used: Curettage
Wound Care: Vaseline
Depth Of Biopsy: dermis
Cryotherapy Text: The wound bed was treated with cryotherapy after the biopsy was performed.
Silver Nitrate Text: The wound bed was treated with silver nitrate after the biopsy was performed.
Information: Selecting Yes will display possible errors in your note based on the variables you have selected. This validation is only offered as a suggestion for you. PLEASE NOTE THAT THE VALIDATION TEXT WILL BE REMOVED WHEN YOU FINALIZE YOUR NOTE. IF YOU WANT TO FAX A PRELIMINARY NOTE YOU WILL NEED TO TOGGLE THIS TO 'NO' IF YOU DO NOT WANT IT IN YOUR FAXED NOTE.
Detail Level: Detailed
Anesthesia Volume In Cc (Will Not Render If 0): 0.5
Anesthesia Type: 1% lidocaine with epinephrine
Size Of Lesion In Cm: 0.8
Biopsy Method: Dermablade
Render Post-Care Instructions In Note?: yes
Notification Instructions: Patient will be notified of biopsy results. However, patient instructed to call the office if not contacted within 2 weeks.
Electrodesiccation Text: The wound bed was treated with electrodesiccation after the biopsy was performed.
Dressing: bandage
Size Of Lesion In Cm: 1
Curettage Text: The wound bed was treated with curettage after the biopsy was performed.
Biopsy Type: H and E
Billing Type: [employer]
Billing Type: Third-Party Bill